# Patient Record
(demographics unavailable — no encounter records)

---

## 2025-01-09 NOTE — HISTORY OF PRESENT ILLNESS
[Former] : former [>= 20 pack years] : >= 20 pack years [TextBox_4] : 80-year-old female with history of mild OAD and abnormal chest CT presents for follow-up.  Patient continues to complain of occasional SOB without cough, chest pain, hemoptysis, night sweats or weight loss.  She uses albuterol on occasion with relief.  Follow-up chest CT was recently performed. [TextBox_11] : 1 [TextBox_13] : 25 [YearQuit] : 2010 [TextBox_29] : Denies snoring, daytime somnolence, apneic episodes, AM headaches Simple: Patient demonstrates quick and easy understanding

## 2025-01-09 NOTE — REASON FOR VISIT
[Follow-Up] : a follow-up visit [Abnormal CXR/ Chest CT] : an abnormal CXR/ chest CT [TextBox_44] : OAD